# Patient Record
Sex: FEMALE | Race: WHITE | Employment: OTHER | URBAN - METROPOLITAN AREA
[De-identification: names, ages, dates, MRNs, and addresses within clinical notes are randomized per-mention and may not be internally consistent; named-entity substitution may affect disease eponyms.]

---

## 2017-04-05 PROCEDURE — 88305 TISSUE EXAM BY PATHOLOGIST: CPT | Performed by: INTERNAL MEDICINE

## 2017-04-11 PROBLEM — F32.A DEPRESSION, UNSPECIFIED DEPRESSION TYPE: Status: ACTIVE | Noted: 2017-04-11

## 2017-05-10 PROCEDURE — 36415 COLL VENOUS BLD VENIPUNCTURE: CPT | Performed by: PEDIATRICS

## 2017-05-10 PROCEDURE — 82784 ASSAY IGA/IGD/IGG/IGM EACH: CPT | Performed by: PEDIATRICS

## 2017-07-06 PROBLEM — K90.0 ACD (ADULT CELIAC DISEASE): Status: ACTIVE | Noted: 2017-07-06

## 2017-07-06 PROBLEM — K90.0: Status: ACTIVE | Noted: 2017-07-06

## 2017-09-06 PROCEDURE — 36415 COLL VENOUS BLD VENIPUNCTURE: CPT | Performed by: PEDIATRICS

## 2017-09-06 PROCEDURE — 82784 ASSAY IGA/IGD/IGG/IGM EACH: CPT | Performed by: PEDIATRICS

## 2017-09-19 PROCEDURE — 82607 VITAMIN B-12: CPT | Performed by: FAMILY MEDICINE

## 2017-09-19 PROCEDURE — 82746 ASSAY OF FOLIC ACID SERUM: CPT | Performed by: FAMILY MEDICINE

## 2017-11-06 PROCEDURE — 82784 ASSAY IGA/IGD/IGG/IGM EACH: CPT | Performed by: PEDIATRICS

## 2017-11-06 PROCEDURE — 36415 COLL VENOUS BLD VENIPUNCTURE: CPT | Performed by: PEDIATRICS

## 2017-12-06 PROCEDURE — 36415 COLL VENOUS BLD VENIPUNCTURE: CPT | Performed by: FAMILY MEDICINE

## 2017-12-06 PROCEDURE — 82746 ASSAY OF FOLIC ACID SERUM: CPT | Performed by: FAMILY MEDICINE

## 2017-12-06 PROCEDURE — 82607 VITAMIN B-12: CPT | Performed by: FAMILY MEDICINE

## 2018-03-28 PROCEDURE — 82784 ASSAY IGA/IGD/IGG/IGM EACH: CPT | Performed by: PEDIATRICS

## 2018-07-18 PROCEDURE — 82784 ASSAY IGA/IGD/IGG/IGM EACH: CPT | Performed by: PEDIATRICS

## 2018-07-18 PROCEDURE — 36415 COLL VENOUS BLD VENIPUNCTURE: CPT | Performed by: PEDIATRICS

## 2018-11-07 PROCEDURE — 36415 COLL VENOUS BLD VENIPUNCTURE: CPT | Performed by: PEDIATRICS

## 2018-11-07 PROCEDURE — 82784 ASSAY IGA/IGD/IGG/IGM EACH: CPT | Performed by: PEDIATRICS

## 2019-02-27 PROCEDURE — 82784 ASSAY IGA/IGD/IGG/IGM EACH: CPT | Performed by: PEDIATRICS

## 2019-06-24 PROCEDURE — 36415 COLL VENOUS BLD VENIPUNCTURE: CPT | Performed by: PEDIATRICS

## 2019-06-24 PROCEDURE — 82784 ASSAY IGA/IGD/IGG/IGM EACH: CPT | Performed by: PEDIATRICS

## 2019-10-02 PROCEDURE — 82784 ASSAY IGA/IGD/IGG/IGM EACH: CPT | Performed by: PEDIATRICS

## 2019-10-02 PROCEDURE — 36415 COLL VENOUS BLD VENIPUNCTURE: CPT | Performed by: PEDIATRICS

## 2019-12-16 PROBLEM — J44.9 CHRONIC OBSTRUCTIVE PULMONARY DISEASE, UNSPECIFIED COPD TYPE (HCC): Status: ACTIVE | Noted: 2019-12-16

## 2020-09-21 ENCOUNTER — APPOINTMENT (OUTPATIENT)
Dept: LAB | Facility: HOSPITAL | Age: 75
End: 2020-09-21
Attending: INTERNAL MEDICINE
Payer: MEDICARE

## 2020-09-21 DIAGNOSIS — Z12.11 COLON CANCER SCREENING: ICD-10-CM

## 2020-09-22 LAB — SARS-COV-2 RNA RESP QL NAA+PROBE: NOT DETECTED

## 2020-09-23 ENCOUNTER — ANESTHESIA EVENT (OUTPATIENT)
Dept: ENDOSCOPY | Facility: HOSPITAL | Age: 75
End: 2020-09-23
Payer: MEDICARE

## 2020-09-23 ENCOUNTER — HOSPITAL ENCOUNTER (OUTPATIENT)
Facility: HOSPITAL | Age: 75
Setting detail: HOSPITAL OUTPATIENT SURGERY
Discharge: HOME | End: 2020-09-23
Attending: INTERNAL MEDICINE | Admitting: INTERNAL MEDICINE
Payer: MEDICARE

## 2020-09-23 ENCOUNTER — ANESTHESIA (OUTPATIENT)
Dept: ENDOSCOPY | Facility: HOSPITAL | Age: 75
End: 2020-09-23
Payer: MEDICARE

## 2020-09-23 VITALS
TEMPERATURE: 99 F | RESPIRATION RATE: 20 BRPM | HEIGHT: 64 IN | OXYGEN SATURATION: 99 % | HEART RATE: 66 BPM | SYSTOLIC BLOOD PRESSURE: 120 MMHG | BODY MASS INDEX: 30.05 KG/M2 | DIASTOLIC BLOOD PRESSURE: 87 MMHG | WEIGHT: 176 LBS

## 2020-09-23 DIAGNOSIS — Z12.11 COLON CANCER SCREENING: Primary | ICD-10-CM

## 2020-09-23 DIAGNOSIS — K90.9 DIARRHEA DUE TO MALABSORPTION: ICD-10-CM

## 2020-09-23 DIAGNOSIS — R19.7 DIARRHEA DUE TO MALABSORPTION: ICD-10-CM

## 2020-09-23 DIAGNOSIS — Z86.010 PERSONAL HISTORY OF COLONIC POLYPS: ICD-10-CM

## 2020-09-23 PROCEDURE — 0DBB8ZX EXCISION OF ILEUM, VIA NATURAL OR ARTIFICIAL OPENING ENDOSCOPIC, DIAGNOSTIC: ICD-10-PCS | Performed by: INTERNAL MEDICINE

## 2020-09-23 PROCEDURE — 88305 TISSUE EXAM BY PATHOLOGIST: CPT | Performed by: INTERNAL MEDICINE

## 2020-09-23 PROCEDURE — 0DBE8ZX EXCISION OF LARGE INTESTINE, VIA NATURAL OR ARTIFICIAL OPENING ENDOSCOPIC, DIAGNOSTIC: ICD-10-PCS | Performed by: INTERNAL MEDICINE

## 2020-09-23 PROCEDURE — 0DB68ZX EXCISION OF STOMACH, VIA NATURAL OR ARTIFICIAL OPENING ENDOSCOPIC, DIAGNOSTIC: ICD-10-PCS | Performed by: INTERNAL MEDICINE

## 2020-09-23 RX ORDER — LIDOCAINE HYDROCHLORIDE 10 MG/ML
INJECTION, SOLUTION EPIDURAL; INFILTRATION; INTRACAUDAL; PERINEURAL AS NEEDED
Status: DISCONTINUED | OUTPATIENT
Start: 2020-09-23 | End: 2020-09-23 | Stop reason: SURG

## 2020-09-23 RX ORDER — HYDROCODONE BITARTRATE AND ACETAMINOPHEN 10; 325 MG/1; MG/1
1 TABLET ORAL AS NEEDED
Status: CANCELLED | OUTPATIENT
Start: 2020-09-23

## 2020-09-23 RX ORDER — SODIUM CHLORIDE, SODIUM LACTATE, POTASSIUM CHLORIDE, CALCIUM CHLORIDE 600; 310; 30; 20 MG/100ML; MG/100ML; MG/100ML; MG/100ML
INJECTION, SOLUTION INTRAVENOUS CONTINUOUS
Status: DISCONTINUED | OUTPATIENT
Start: 2020-09-23 | End: 2020-09-23

## 2020-09-23 RX ORDER — NALOXONE HYDROCHLORIDE 0.4 MG/ML
80 INJECTION, SOLUTION INTRAMUSCULAR; INTRAVENOUS; SUBCUTANEOUS AS NEEDED
Status: CANCELLED | OUTPATIENT
Start: 2020-09-23 | End: 2020-09-23

## 2020-09-23 RX ORDER — ONDANSETRON 2 MG/ML
4 INJECTION INTRAMUSCULAR; INTRAVENOUS AS NEEDED
Status: DISCONTINUED | OUTPATIENT
Start: 2020-09-23 | End: 2020-09-23

## 2020-09-23 RX ORDER — HYDROMORPHONE HYDROCHLORIDE 1 MG/ML
0.4 INJECTION, SOLUTION INTRAMUSCULAR; INTRAVENOUS; SUBCUTANEOUS EVERY 5 MIN PRN
Status: DISCONTINUED | OUTPATIENT
Start: 2020-09-23 | End: 2020-09-23

## 2020-09-23 RX ORDER — HYDROCODONE BITARTRATE AND ACETAMINOPHEN 10; 325 MG/1; MG/1
2 TABLET ORAL AS NEEDED
Status: CANCELLED | OUTPATIENT
Start: 2020-09-23

## 2020-09-23 RX ORDER — METOCLOPRAMIDE HYDROCHLORIDE 5 MG/ML
10 INJECTION INTRAMUSCULAR; INTRAVENOUS AS NEEDED
Status: DISCONTINUED | OUTPATIENT
Start: 2020-09-23 | End: 2020-09-23

## 2020-09-23 RX ADMIN — LIDOCAINE HYDROCHLORIDE 25 MG: 10 INJECTION, SOLUTION EPIDURAL; INFILTRATION; INTRACAUDAL; PERINEURAL at 11:03:00

## 2020-09-23 NOTE — ANESTHESIA POSTPROCEDURE EVALUATION
[facility] Patient Status:  Hospital Outpatient Surgery   Age/Gender 70year old female MRN [id number]   Location [facility]. Attending Meredith Martinez MD   Hosp Day # 0 PCP Magdalena Gilmore MD       Anesthesia Pos

## 2020-09-23 NOTE — H&P
[address] Patient Status:  Hospital Outpatient Surgery    1945 MRN [id number]   [facility] ENDOSCOPY Attending Jose Francisco Haskins MD   Hosp Day # 0 PCP Demetrio Anthony MD     Date:   4/17    celiac; duodenitis   • STRESS ECHO TEST, CARDIO (DMG)  8/12    neg   • THYROID ABLATION  1980   • UPPER GI ENDOSCOPY - REFERRAL  7/27/11    duodenitis, possible celiac disease     Family History   Problem Relation Age of Onset   • Psychiatric Fathe (100 mcg total) by mouth daily. •  Venlafaxine HCl ER 75 MG Oral Capsule SR 24 Hr, Take 3 capsules (225 mg total) by mouth daily. •  ALPRAZolam 0.5 MG Oral Tab, Take 1 tablet (0.5 mg total) by mouth 2 (two) times daily as needed.     •  alendronate 70 11/14/2019    ALKPHO 98 11/14/2019    BILT 0.35 11/14/2019    TP 7.7 11/14/2019    AST 26 12/16/2019    ALT 14 12/16/2019    PTT 26.4 09/07/2016    INR 0.94 09/07/2016    T4F 1.56 07/01/2020    TSH 0.192 (L) 07/01/2020    GGT 41 05/25/2011    DDIMER <0.27

## 2020-09-23 NOTE — OPERATIVE REPORT
EGD/Colonoscopy Operative Report    Rex Boyce Patient Status:  Hospital Outpatient Surgery    1945 MRN [id number]   [facility] ENDOSCOPY Attending Xena Parker MD   [facility] Day patient tolerated the procedure well with no immediate complications. The patient was then repositioned for colonoscopy.   After careful digital rectal examination, the Adult colonoscope was inserted into the rectum and advanced to the level of the cecum un

## 2020-09-23 NOTE — ANESTHESIA PREPROCEDURE EVALUATION
PRE-OP EVALUATION    Patient Name: Ron Mendoza    Pre-op Diagnosis: Diarrhea due to malabsorption [K90.9, R19.7]  Personal history of colonic polyps [Z86.010]    Procedure(s):  COLONOSCOPY, ESOPHAGOGASTRODUODENOSCOPY      Surgeon(s) and Role: Symbicort, Tylenol [Acetaminophen], and Zoloft      Anesthesia Evaluation    Patient summary reviewed.     Anesthetic Complications           GI/Hepatic/Renal                                 Cardiovascular                (+) obesity  (+) hypertension FB  TM distance: < 4 cm  Neck ROM: limited Cardiovascular    Cardiovascular exam normal.  Rhythm: regular  Rate: normal     Dental             Pulmonary    Pulmonary exam normal.  Breath sounds clear to auscultation bilaterally.                Other finding

## 2020-11-17 PROBLEM — F32.A DEPRESSION, UNSPECIFIED DEPRESSION TYPE: Status: RESOLVED | Noted: 2017-04-11 | Resolved: 2020-11-17

## 2020-11-17 PROBLEM — F32.0 CURRENT MILD EPISODE OF MAJOR DEPRESSIVE DISORDER WITHOUT PRIOR EPISODE (HCC): Status: ACTIVE | Noted: 2020-11-17

## 2020-11-17 PROBLEM — F32.0 CURRENT MILD EPISODE OF MAJOR DEPRESSIVE DISORDER WITHOUT PRIOR EPISODE: Status: ACTIVE | Noted: 2020-11-17

## 2020-11-17 PROBLEM — K52.832 LYMPHOCYTIC COLITIS: Status: ACTIVE | Noted: 2020-11-17

## 2021-06-30 PROBLEM — D69.6 PLATELETS DECREASED (HCC): Status: ACTIVE | Noted: 2021-06-30

## 2021-06-30 PROBLEM — D69.6 PLATELETS DECREASED: Status: ACTIVE | Noted: 2021-06-30

## 2021-08-26 PROBLEM — R25.1 TREMOR OF LEFT HAND: Status: ACTIVE | Noted: 2021-08-26

## 2021-08-26 PROBLEM — R41.9 COGNITIVE COMPLAINTS: Status: ACTIVE | Noted: 2021-08-26

## 2021-08-26 PROBLEM — G44.059 SUNCT (SHORT UNILATERAL NEURALGIFORM HEADACHE, CONJUNCTIVAL INJ/TEAR): Status: ACTIVE | Noted: 2021-08-26

## 2021-12-20 PROBLEM — I67.1 BRAIN ANEURYSM: Status: ACTIVE | Noted: 2021-01-01

## 2021-12-20 PROBLEM — I67.1 BRAIN ANEURYSM (HCC): Status: ACTIVE | Noted: 2021-01-01

## 2023-08-15 ENCOUNTER — TELEPHONE (OUTPATIENT)
Dept: SURGERY | Facility: CLINIC | Age: 78
End: 2023-08-15

## 2023-08-15 NOTE — TELEPHONE ENCOUNTER
Chart Scrub Encounter:    Next Diabetic Visit Scheduled:  Yes      Labs:  PSR will call patient for lab appointment.Patient is due for  Hemoglobin A1C.  Order is entered.      Diabetic Education:  Patient has no record of attending Diabetic Education.    Statin Medication:  Patient is taking Simvastatin.     Eye Exam:  Date of last exam 6/8/20.   Patient is due.    Foot Exam:  Date of last exam 5/18/21.   Patient is not due.    PSR please call patient and schedule fasting lab appointment.          Pt looking for 2nd opinion for aneurysm, pt will c/b to schedule with Dr Kim Kaba once knows when can obtain disks of recent imaging,

## 2023-09-18 ENCOUNTER — OFFICE VISIT (OUTPATIENT)
Dept: SURGERY | Facility: CLINIC | Age: 78
End: 2023-09-18
Payer: MEDICARE

## 2023-09-18 VITALS
HEIGHT: 63.5 IN | HEART RATE: 80 BPM | DIASTOLIC BLOOD PRESSURE: 82 MMHG | SYSTOLIC BLOOD PRESSURE: 140 MMHG | BODY MASS INDEX: 32.2 KG/M2 | WEIGHT: 184 LBS

## 2023-09-18 DIAGNOSIS — I67.1 BRAIN ANEURYSM: Primary | ICD-10-CM

## 2023-09-18 PROBLEM — R93.1 AGATSTON CAC SCORE, >400: Status: ACTIVE | Noted: 2023-01-23

## 2023-09-18 PROBLEM — R55 NEAR SYNCOPE: Status: ACTIVE | Noted: 2023-08-09

## 2023-09-18 RX ORDER — ATORVASTATIN CALCIUM 40 MG/1
TABLET, FILM COATED ORAL
COMMUNITY
Start: 2023-09-13

## 2023-09-18 RX ORDER — ATORVASTATIN CALCIUM 20 MG/1
TABLET, FILM COATED ORAL
COMMUNITY
Start: 2023-06-03 | End: 2023-09-18

## 2023-09-18 RX ORDER — DIPHENOXYLATE HYDROCHLORIDE AND ATROPINE SULFATE 2.5; .025 MG/1; MG/1
1 TABLET ORAL 2 TIMES DAILY PRN
COMMUNITY
Start: 2023-08-25

## 2023-09-20 ENCOUNTER — OFFICE VISIT (OUTPATIENT)
Dept: NEUROLOGY | Facility: CLINIC | Age: 78
End: 2023-09-20
Payer: MEDICARE

## 2023-09-20 VITALS
WEIGHT: 184 LBS | BODY MASS INDEX: 32 KG/M2 | DIASTOLIC BLOOD PRESSURE: 78 MMHG | OXYGEN SATURATION: 95 % | SYSTOLIC BLOOD PRESSURE: 126 MMHG | RESPIRATION RATE: 16 BRPM | HEART RATE: 74 BPM

## 2023-09-20 DIAGNOSIS — R55 NEAR SYNCOPE: ICD-10-CM

## 2023-09-20 DIAGNOSIS — R51.9 LEFT TEMPORAL HEADACHE: Primary | ICD-10-CM

## 2023-09-20 PROCEDURE — 99204 OFFICE O/P NEW MOD 45 MIN: CPT | Performed by: OTHER

## 2023-09-20 NOTE — PROGRESS NOTES
Patient states a shooting pain on the left temporal region. Patient states this started about 2 years ago. Patient denies HA or migraines. Patient states 2 episodes of changes in vision and/or loss of vision. Patient states each episode did not last long. Patient states a history of balance issues. Patient denies changes in memory. Patient states a slight dizziness sensation. Patient has left hand tremors.

## 2023-10-03 ENCOUNTER — NURSE ONLY (OUTPATIENT)
Dept: ELECTROPHYSIOLOGY | Facility: HOSPITAL | Age: 78
End: 2023-10-03
Attending: Other
Payer: MEDICARE

## 2023-10-03 DIAGNOSIS — R51.9 LEFT TEMPORAL HEADACHE: ICD-10-CM

## 2023-10-03 DIAGNOSIS — R55 NEAR SYNCOPE: ICD-10-CM

## 2023-10-03 PROCEDURE — 95816 EEG AWAKE AND DROWSY: CPT | Performed by: OTHER

## 2023-10-03 NOTE — PROCEDURES
Date of Procedure: 10/3/2023    Procedure: EEG (ELECTROENCEPHALOGRAM)     LEFT TEMPORAL HEADACHES, NEAR SYNCOPE, TREMOR  HISTORY: 70YEARS OLD FEMALE FOR THE LAST 2 YEARS REPORTS SHOOTING PAIN ON LEFT TEMPORAL REGION; EPISODES OF CHANGE IN VISION, LEFT HAND TREMORS WITH EPISODES OF NEAR SYNCOPE. 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 Noted also are generalized intermittent slowing. Drowsiness is characterized by intermittent theta waves bitemporally. Occasional sharp transients noted in posterior region. EPILEPTIFORM DISCHARGES: There were no epileptiform discharges or periodic lateralized epileptiform discharges (PLEDs) recorded throughout the recording. HYPERVENTILATION: Hyperventilation was not performed. PHOTIC STIMULATION: Photic stimulation was performed with no change. Stage II sleep was not reached. IMPRESSION: Unremarkable EEG without any interictal discharges, electrographic seizure activity or epileptiform activity. However, this does not rule out seizure disorder. Clinical correlation is advised.     Lucy Hamlin MD   Neurology  [facility]  10/3/2023, 1:56 PM  CC: Steve Huber MD

## 2023-10-16 ENCOUNTER — TELEPHONE (OUTPATIENT)
Dept: NEUROLOGY | Facility: CLINIC | Age: 78
End: 2023-10-16

## 2023-10-16 NOTE — TELEPHONE ENCOUNTER
Pt called to obtain her EEG results from early this month. She states that she hasn't received anything through her [facility].  Please advise

## 2025-02-05 NOTE — H&P
[facility]  Division of Cardiology  Pre-procedure  Updated H&P       Patient Name: Diana Swanson  MRN: [id number]  [facility]: [id number]  YOB: 1945    Diagnosis: Abnormal FFR, CTA, symptomatic CAD    Present Illness: CREWS and dizziness and CTA with obstructive CAD by FFR.    Interval change: None.    Medications Prior to Admission   Medication Sig    metoprolol succinate ER 25 MG Oral Tablet 24 Hr Take 1 tablet (25 mg total) by mouth daily.    Cholecalciferol (VITAMIN D3) 1.25 MG (81467 UT) Oral Cap Take 1 capsule by mouth once a week. Tuesday    atorvastatin 40 MG Oral Tab Take 1 tablet (40 mg total) by mouth daily.    diphenoxylate-atropine 2.5-0.025 MG Oral Tab Take 1 tablet by mouth 2 (two) times daily as needed for Diarrhea.    ALPRAZolam 0.5 MG Oral Tab Take 1 tablet (0.5 mg total) by mouth 2 (two) times daily as needed.    dicyclomine 10 MG Oral Cap Take 1 capsule (10 mg total) by mouth 4 (four) times daily as needed.    levothyroxine 112 MCG Oral Tab Take 1 tablet (112 mcg total) by mouth before breakfast.    venlafaxine 75 MG Oral Capsule SR 24 Hr Take 3 capsules (225 mg total) by mouth daily.    lisinopril 10 MG Oral Tab Take 1 tablet (10 mg total) by mouth daily.    Immune Globulin, Human, 10 GM/100ML Injection Solution Inject 40 g into the vein every 28 days. Gammagard    Albuterol Sulfate HFA (PROAIR HFA) 108 (90 Base) MCG/ACT Inhalation Aero Soln Inhale 2 puffs into the lungs every 4 (four) hours as needed for Wheezing or Shortness of Breath.    Acidophilus/Pectin Oral Cap Take 1 capsule by mouth nightly.    CYANOCOBALAMIN IJ Inject 1,000 mcg as directed. Every 3 months       Allergies: Allergies[1]    History:  Past Medical History:    Adenomatous colon polyp    ANXIETY    Brain aneurysm (HCC)    1.5 mm right cavernous ICA    Celiac disease (HCC)    DEPRESSION    Elevated liver enzymes    possible CBD stone that passed    Folate deficiency     HEADACHES    Hearing impairment    bilat HA    High blood pressure    Hyperlipidemia    HYPERTHYROIDISM    s/p BRENNAN    Hypogammaglobulinemia (HCC)    Left carotid artery stenosis    Lymphocytic colitis    Migraines    barometric presssure    Osteopenia    fem neck -1.8, hip -1.1, lumbar -1.0    SEASONAL ALLERGIES    Visual impairment    glasses    Vitamin B12 deficiency     Past Surgical History:   Procedure Laterality Date    Cholecystectomy  1997    Colonoscopy  2000    polyp    Colonoscopy  7/27/11    hemorrhoids, repeat 2016 w/MAC    Colonoscopy N/A 4/5/2017    Procedure: COLONOSCOPY, POSSIBLE BIOPSY, POSSIBLE POLYPECTOMY 56968;  Surgeon: Escobar Kelsey MD;  Location: [facility]    Colonoscopy N/A 9/23/2020    Procedure: COLONOSCOPY;  Surgeon: Tushar Kuhn MD;  Location:  ENDOSCOPY    Colonoscopy & polypectomy  4/17- repeat 2020    polyps    Colonoscopy,biopsy  7/27/2011    Performed by NORBERTO PRAKASH at [facility], [facility]    Colonoscopy,remv lesn,snare  7/27/2011    Performed by NORBERTO PRAKASH at [facility], [facility]    Ct heart w/ calcium scoring  2011    126    D & c  1991    Gastro - [facility]  4/17    celiac; duodenitis    Stress echo test, cardio ([facility])  8/12    neg    Thyroid ablation  1980    Upper gi endoscopy - referral  7/27/11    duodenitis, possible celiac disease    Upper gi endoscopy,biopsy  7/27/2011    Performed by NORBERTO PRAKASH at [facility], [facility]     Social History     Tobacco Use    Smoking status: Every Day     Current packs/day: 0.50     Average packs/day: 0.5 packs/day for 45.0 years (22.5 ttl pk-yrs)     Types: Cigarettes    Smokeless tobacco: Never   Substance Use Topics    Alcohol use: Not Currently     Comment: rarely     Family History   Problem Relation Age of Onset    Psychiatric Father         Alcoholic    Other (Other) Mother         thyroid and osteoporosis    Cancer Brother         brain    Other (liver cirrhosis) Brother     Breast Cancer Maternal Aunt 60         age at dx 60    Cancer Maternal Grandfather         Colon    Cancer Other         Maternal Aunts with Breast Cancer    Heart Disorder Other         Maternal Aunts    Lipids Other     Diabetes Other         Uncle and Aunts    Breast Cancer Maternal Aunt 60        age at dx 60       Objective:  Vitals:    02/10/25 1228   BP: (!) 119/108   Pulse: 69   Resp: 16   Temp: 97.8 °F (36.6 °C)       Exam:  General: NAD  Neck: No JVD  Lungs: CTA bilat  Heart: RRR, S1, S2  Abdomen: Soft, NT/ND, BS+x4  Extremities: Warm, dry, no LE edema bilat  Pulses: 2+ bilat DP  Skin: no rashes or legions noted  Neurological:  AAOx3, MAEW    Labs:             Plan:  Cardiac catheterization, coronary angio, +/- PCI.  Further recommendations after above.    Informed Consent:  I've discussed the procedure at length with the patient including the risks, benefits, and alternatives.  They wish to proceed.  I've reviewed the H&P and any changes within the last 30 days are noted above.    Thank you for allowing our group to care for your patient. Please contact me with any questions!     Ángel Miller MD  General, Interventional  Structural & Endovascular  Cardiology                  [1]   Allergies  Allergen Reactions    Aspirin RASH    Codeine NAUSEA AND VOMITING    Erythromycin ANAPHYLAXIS and SWELLING    Lexapro     Omnicef     Paxil Cr [Fd&C Blue #2 Al Maguire-Paroxetine] DIARRHEA    Streptomycin     Sulfa Antibiotics     Symbicort     Tylenol [Acetaminophen] DIARRHEA    Zoloft DIARRHEA    Paroxetine Hcl DIARRHEA

## 2025-02-06 RX ORDER — FOLIC ACID 1 MG/1
1 TABLET ORAL WEEKLY
COMMUNITY

## 2025-02-06 RX ORDER — METOPROLOL SUCCINATE 25 MG/1
25 TABLET, EXTENDED RELEASE ORAL DAILY
COMMUNITY

## 2025-02-06 NOTE — PAT NURSING NOTE
Per PAT encounter/MyChart message sent to pt/took notes:    PreOp Instructions     You are scheduled for: a Cardiac Procedure     Date of Procedure: 02/10/25 Monday     Diet Instructions: Do not eat or drink anything after midnight including gum, mints, candy, etc.     Medications: Medications you are allowed to take can be taken with a sip of water the morning of your procedure     Medications to Stop: Hold herbal supplements and vitamins     Skin Prep : Shower with antibacterial soap using a clean washcloth, prior to procedure. Once dried off, no lotions/powders/creams/ointments, etc., Do not shave the procedure area, this will be completed at the hospital during the preparation phase.     Arrival Time: The Friday prior to your procedure you will receive a phone call before 6:00 pm with your arrival time. If you haven't received a phone call, please check your voicemail messages., If you did not receive a voice mail and it is after 6:00 pm, please call the nursing supervisor at [phone].    Driving After Procedure: Sedation will be given so you WILL NOT be able to drive home. You will need a responsible adult  to drive you home. You can NOT take uber or taxi unless approved by your physician in advance.     Discharge Teaching: Your nurse will give you specific instructions before discharge, Most people can resume normal activities in 2-3 days, Any questions, please call the physician's office      parking is available starting at 6 am or park in the [place] parking garage at [facility]. Check in at the [facility] reception desk. Our  will be there to check you in for your procedure. Please bring your insurance cards and ID with you.                                                                                                                                      Please DO NOT respond to this message, the inbasket is not monitored for messages. For any questions,  please call the physician's office.

## 2025-02-10 ENCOUNTER — HOSPITAL ENCOUNTER (OUTPATIENT)
Dept: INTERVENTIONAL RADIOLOGY/VASCULAR | Facility: HOSPITAL | Age: 80
Discharge: HOME | End: 2025-02-10
Attending: INTERNAL MEDICINE | Admitting: INTERNAL MEDICINE
Payer: MEDICARE

## 2025-02-10 VITALS
HEIGHT: 64 IN | SYSTOLIC BLOOD PRESSURE: 103 MMHG | TEMPERATURE: 98 F | WEIGHT: 184 LBS | BODY MASS INDEX: 31.41 KG/M2 | RESPIRATION RATE: 21 BRPM | HEART RATE: 61 BPM | DIASTOLIC BLOOD PRESSURE: 48 MMHG | OXYGEN SATURATION: 98 %

## 2025-02-10 DIAGNOSIS — R93.1 ABNORMAL FINDINGS DIAGNOSTIC IMAGING OF HEART AND CORONARY CIRCULATION: ICD-10-CM

## 2025-02-10 PROCEDURE — 93454 CORONARY ARTERY ANGIO S&I: CPT | Performed by: INTERNAL MEDICINE

## 2025-02-10 PROCEDURE — 93005 ELECTROCARDIOGRAM TRACING: CPT

## 2025-02-10 PROCEDURE — 93799 UNLISTED CV SVC/PROCEDURE: CPT | Performed by: INTERNAL MEDICINE

## 2025-02-10 PROCEDURE — 4A033BC MEASUREMENT OF ARTERIAL PRESSURE, CORONARY, PERCUTANEOUS APPROACH: ICD-10-PCS | Performed by: INTERNAL MEDICINE

## 2025-02-10 PROCEDURE — 99211 OFF/OP EST MAY X REQ PHY/QHP: CPT

## 2025-02-10 PROCEDURE — 93010 ELECTROCARDIOGRAM REPORT: CPT | Performed by: INTERNAL MEDICINE

## 2025-02-10 PROCEDURE — B2111ZZ FLUOROSCOPY OF MULTIPLE CORONARY ARTERIES USING LOW OSMOLAR CONTRAST: ICD-10-PCS | Performed by: INTERNAL MEDICINE

## 2025-02-10 PROCEDURE — 99152 MOD SED SAME PHYS/QHP 5/>YRS: CPT | Performed by: INTERNAL MEDICINE

## 2025-02-10 PROCEDURE — 99153 MOD SED SAME PHYS/QHP EA: CPT | Performed by: INTERNAL MEDICINE

## 2025-02-10 PROCEDURE — 02703ZZ DILATION OF CORONARY ARTERY, ONE ARTERY, PERCUTANEOUS APPROACH: ICD-10-PCS | Performed by: INTERNAL MEDICINE

## 2025-02-10 RX ORDER — LIDOCAINE HYDROCHLORIDE 10 MG/ML
INJECTION, SOLUTION EPIDURAL; INFILTRATION; INTRACAUDAL; PERINEURAL
Status: COMPLETED
Start: 2025-02-10 | End: 2025-02-10

## 2025-02-10 RX ORDER — NITROGLYCERIN 20 MG/100ML
INJECTION INTRAVENOUS
Status: COMPLETED
Start: 2025-02-10 | End: 2025-02-10

## 2025-02-10 RX ORDER — IOPAMIDOL 755 MG/ML
200 INJECTION, SOLUTION INTRAVASCULAR
Status: COMPLETED | OUTPATIENT
Start: 2025-02-10 | End: 2025-02-10

## 2025-02-10 RX ORDER — ASPIRIN 81 MG/1
81 TABLET ORAL DAILY
Status: DISCONTINUED | OUTPATIENT
Start: 2025-02-11 | End: 2025-02-10

## 2025-02-10 RX ORDER — VERAPAMIL HYDROCHLORIDE 2.5 MG/ML
INJECTION, SOLUTION INTRAVENOUS
Status: COMPLETED
Start: 2025-02-10 | End: 2025-02-10

## 2025-02-10 RX ORDER — MIDAZOLAM HYDROCHLORIDE 1 MG/ML
INJECTION INTRAMUSCULAR; INTRAVENOUS
Status: COMPLETED
Start: 2025-02-10 | End: 2025-02-10

## 2025-02-10 RX ORDER — HEPARIN SODIUM 5000 [USP'U]/ML
INJECTION, SOLUTION INTRAVENOUS; SUBCUTANEOUS
Status: COMPLETED
Start: 2025-02-10 | End: 2025-02-10

## 2025-02-10 RX ORDER — SODIUM CHLORIDE 9 MG/ML
INJECTION, SOLUTION INTRAVENOUS CONTINUOUS
Status: DISCONTINUED | OUTPATIENT
Start: 2025-02-10 | End: 2025-02-10

## 2025-02-10 RX ORDER — SODIUM CHLORIDE 9 MG/ML
INJECTION, SOLUTION INTRAVENOUS
Status: DISCONTINUED | OUTPATIENT
Start: 2025-02-11 | End: 2025-02-10 | Stop reason: HOSPADM

## 2025-02-10 RX ADMIN — IOPAMIDOL 230 ML: 755 INJECTION, SOLUTION INTRAVASCULAR at 14:56:00

## 2025-02-10 NOTE — CARDIAC REHAB
Cardiac rehab education completed with patient and son  Stent card given  Patient referred to cardiac rehab  Patient to call for appt

## 2025-02-10 NOTE — PROCEDURES
[facility] Division of Cardiology   Cardiac Catheterization & Percutaneous Coronary Intervention     Diana Swanson Location: [facility]    CSN [id number] MRN [id number]   Admission Date 2/10/2025 Procedure Date 2/10/2025   Attending Physician Ángel Miller MD Procedure Physician Ángel Miller MD     PREOPERATIVE DIAGNOSIS:  CAD, abnormal stress  POSTOPERATIVE DIAGNOSIS:  CAD  PROCEDURE PERFORMED:  Coronary angiogram, PCI to LAD with SANDRA, iFR LAD and DX      PROCEDURE:    Moderate sedation: The patient was brought to the cardiac catheterization lab in the fasting state.  Informed consent was previously obtained.  Moderate sedation was employed using 6mg IV Versed and 125mcg IV Fentanyl.  I directly observed the patient from 310 to 205, watching the heart rate, blood pressure, oximetry, and rhythm.  An independent, trained observer was present throughout the procedure and assisted in the monitoring of the patient's level of consciousness and physiologic states.  Please see the [facility] Catheterization Report (MCRTM) in [facility] for further, technical details.  Vascular access and catheter placement:  A 6F right radial, slender sheath was utilized after micropuncture access gained.  A Christine catheter was used for LM and for RCA engagement and subsequent angiography.  Standard over the wire technique was utilized.  GLAA and GONZALEZ angiographic views with caudal and cranial angulation were used for cineangiography.  Hemodynamics were measured in the standard fashion using fluid filled, pressure manometry via the ASSISTTM device and analyzed with the Nulu catheterization software.  At the end of the case, a TRTM band was used for arterial hemostasis and our standard protocol was followed.  Coronary or endovascular intervention: EBU 3.5 guide.  Heparin.  BMW wire to \"anchor\" guide and Volcano Pressure WireTM to perform iFR.  We ran it down 3 spots in LAD and into the diagonal.  iFR  was clearly positive in distal LAD (0.84) and borderline in mid LAD (0.9) and negative in proximal LAD (after the proximal lesion -- 0.93).  In the DX the iFR was 0.9.  Taken together this implied the mid to distal LAD (after lesion 3 in LAD) was ischemic.  We ballooned with a 2.0mm balloon, stented with [id number] Reynold SANDRA at 12ATM and post dilated with 2.5mm NC to 16ATM.  Final pics after NTG given IC for spasm.  Excellent result.  Case ended.       DIAGNOSTIC FINDINGS:    Coronary angiography:    LMCA: The left main artery is normal.  LAD:  The left anterior descending artery is large.  Proximally there is a 40% stenosis.  After the second diagonal (significant sized branch) there is a 70% stenosis (iFR borderline).  The diagonal has an iFR also of 0.9.  Then there  is a \"hazy\" 70% stenosis in the mid LAD (and just distal to this the iFR was 0.84 (significant).  LCX: The left circumflex artery is moderate in caliber with mild disease.   RCA:  The right coronary artery is dominant, large with a large PDA and large KAYLAH. Moderate disease.    MEDICATIONS:  See nursing records, MCR, and EMR.     COMPLICATIONS:  None.     IMPRESSION:    CAD as above.  CT FFR mid to distal LAD and iFR positive (significantly) in the same territory.  S/P PCI to mid to distal LAD with SANDRA.    RECOMMENDATIONS:                                                                     Brilinta 90mg BID x 1 month then Plavix 75mg daily afterwards.  May try ASA again given it only caused a rash.    Medically manage CAD.  Follow up in clinic with myself or Sara Humes in 2-3 weeks.  Home later today baring any issues.    Ángel Miller MD  General, Interventional  Structural & Endovascular  Cardiology

## 2025-02-10 NOTE — DIETARY NOTE
Clinical Nutrition    Dietitian consult received per cardiac rehab standing order. Pt to be educated by cardiac rehab staff and encouraged to attend outpatient classes taught by RD. RD available PRN.    Lizbeth Pierce MS, RD, LDN  Clinical Dietitian  Ext: [phone]

## 2025-02-11 LAB
ATRIAL RATE: 66 BPM
P AXIS: 44 DEGREES
P-R INTERVAL: 224 MS
Q-T INTERVAL: 456 MS
QRS DURATION: 76 MS
QTC CALCULATION (BEZET): 478 MS
R AXIS: -20 DEGREES
T AXIS: 49 DEGREES
VENTRICULAR RATE: 66 BPM

## 2025-02-11 NOTE — PLAN OF CARE
Patient had PCI today with Dr. Miller. Right wrist access site with TR band in place. Site is CDI. Patient denies any numbness or tingling to right hand/fingers. Patient has good O2 pleth on right hand. VSS. Patient denies any pain. 12 lead EKG completed. Patient's son @ bedside. Patient tolerating po intake. Cardiac rehab @ bedside to see patient. Dr. Miller @ bedside post procedure. Air intermittently released from TR band until all air removed. TR band removed. Site is soft, CDI, +2 radial pulse. Occlusive dressing applied. Patient completed recovery time. Discharge instructions reviewed. IV D/C'd. Patient discharged to Health system by wheelchair with belongings. Son is .

## 2025-02-12 ENCOUNTER — PATIENT OUTREACH (OUTPATIENT)
Dept: CASE MANAGEMENT | Age: 80
End: 2025-02-12

## 2025-02-12 NOTE — PROGRESS NOTES
Called pt to schedule a hospital follow-up appt :    (Discharged 2/10 [facility])    [facility] Request :    Dr. Ángel Miller  [address] [place] [id number]  [phone]  Monday 3/3 @10am w/ Yamile ODEN        Appt scheduled & confirmed w/ [facility].  No further assistance needed        Closing encounter

## 2025-04-09 ENCOUNTER — ORDER TRANSCRIPTION (OUTPATIENT)
Dept: CARDIAC REHAB | Facility: HOSPITAL | Age: 80
End: 2025-04-09

## 2025-04-09 DIAGNOSIS — Z95.5 STENTED CORONARY ARTERY: Primary | ICD-10-CM

## 2025-04-15 ENCOUNTER — CARDPULM VISIT (OUTPATIENT)
Age: 80
End: 2025-04-15
Attending: INTERNAL MEDICINE
Payer: MEDICARE

## 2025-04-17 ENCOUNTER — APPOINTMENT (OUTPATIENT)
Age: 80
End: 2025-04-17
Attending: INTERNAL MEDICINE
Payer: MEDICARE

## 2025-04-22 ENCOUNTER — CARDPULM VISIT (OUTPATIENT)
Age: 80
End: 2025-04-22
Attending: INTERNAL MEDICINE
Payer: MEDICARE

## 2025-04-22 PROCEDURE — 93798 PHYS/QHP OP CAR RHAB W/ECG: CPT

## 2025-04-24 ENCOUNTER — CARDPULM VISIT (OUTPATIENT)
Age: 80
End: 2025-04-24
Attending: INTERNAL MEDICINE
Payer: MEDICARE

## 2025-04-24 PROCEDURE — 93798 PHYS/QHP OP CAR RHAB W/ECG: CPT

## 2025-04-29 ENCOUNTER — CARDPULM VISIT (OUTPATIENT)
Age: 80
End: 2025-04-29
Attending: INTERNAL MEDICINE
Payer: MEDICARE

## 2025-04-29 PROCEDURE — 93798 PHYS/QHP OP CAR RHAB W/ECG: CPT

## 2025-05-01 ENCOUNTER — APPOINTMENT (OUTPATIENT)
Age: 80
End: 2025-05-01
Attending: INTERNAL MEDICINE
Payer: MEDICARE

## 2025-05-02 ENCOUNTER — CARDPULM VISIT (OUTPATIENT)
Age: 80
End: 2025-05-02
Attending: INTERNAL MEDICINE
Payer: MEDICARE

## 2025-05-02 PROCEDURE — 93798 PHYS/QHP OP CAR RHAB W/ECG: CPT

## 2025-05-05 ENCOUNTER — CARDPULM VISIT (OUTPATIENT)
Age: 80
End: 2025-05-05
Attending: INTERNAL MEDICINE
Payer: MEDICARE

## 2025-05-05 PROCEDURE — 93798 PHYS/QHP OP CAR RHAB W/ECG: CPT

## 2025-05-06 ENCOUNTER — APPOINTMENT (OUTPATIENT)
Age: 80
End: 2025-05-06
Attending: INTERNAL MEDICINE
Payer: MEDICARE

## 2025-05-07 ENCOUNTER — CARDPULM VISIT (OUTPATIENT)
Age: 80
End: 2025-05-07
Attending: INTERNAL MEDICINE
Payer: MEDICARE

## 2025-05-07 PROCEDURE — 93798 PHYS/QHP OP CAR RHAB W/ECG: CPT

## 2025-05-08 ENCOUNTER — APPOINTMENT (OUTPATIENT)
Age: 80
End: 2025-05-08
Attending: INTERNAL MEDICINE
Payer: MEDICARE

## 2025-05-09 ENCOUNTER — CARDPULM VISIT (OUTPATIENT)
Age: 80
End: 2025-05-09
Attending: INTERNAL MEDICINE
Payer: MEDICARE

## 2025-05-09 PROCEDURE — 93798 PHYS/QHP OP CAR RHAB W/ECG: CPT

## 2025-05-12 ENCOUNTER — CARDPULM VISIT (OUTPATIENT)
Age: 80
End: 2025-05-12
Attending: INTERNAL MEDICINE
Payer: MEDICARE

## 2025-05-13 ENCOUNTER — APPOINTMENT (OUTPATIENT)
Age: 80
End: 2025-05-13
Attending: INTERNAL MEDICINE
Payer: MEDICARE

## 2025-05-14 ENCOUNTER — CARDPULM VISIT (OUTPATIENT)
Age: 80
End: 2025-05-14
Attending: INTERNAL MEDICINE
Payer: MEDICARE

## 2025-05-14 PROCEDURE — 93798 PHYS/QHP OP CAR RHAB W/ECG: CPT

## 2025-05-15 ENCOUNTER — APPOINTMENT (OUTPATIENT)
Age: 80
End: 2025-05-15
Attending: INTERNAL MEDICINE
Payer: MEDICARE

## 2025-05-16 ENCOUNTER — CARDPULM VISIT (OUTPATIENT)
Age: 80
End: 2025-05-16
Attending: INTERNAL MEDICINE
Payer: MEDICARE

## 2025-05-16 PROCEDURE — 93798 PHYS/QHP OP CAR RHAB W/ECG: CPT

## 2025-05-19 ENCOUNTER — CARDPULM VISIT (OUTPATIENT)
Age: 80
End: 2025-05-19
Attending: INTERNAL MEDICINE
Payer: MEDICARE

## 2025-05-19 PROCEDURE — 93798 PHYS/QHP OP CAR RHAB W/ECG: CPT

## 2025-05-20 ENCOUNTER — APPOINTMENT (OUTPATIENT)
Age: 80
End: 2025-05-20
Attending: INTERNAL MEDICINE
Payer: MEDICARE

## 2025-05-21 ENCOUNTER — CARDPULM VISIT (OUTPATIENT)
Age: 80
End: 2025-05-21
Attending: INTERNAL MEDICINE
Payer: MEDICARE

## 2025-05-21 PROCEDURE — 93798 PHYS/QHP OP CAR RHAB W/ECG: CPT

## 2025-05-22 ENCOUNTER — APPOINTMENT (OUTPATIENT)
Age: 80
End: 2025-05-22
Attending: INTERNAL MEDICINE
Payer: MEDICARE

## 2025-05-23 ENCOUNTER — CARDPULM VISIT (OUTPATIENT)
Age: 80
End: 2025-05-23
Attending: INTERNAL MEDICINE
Payer: MEDICARE

## 2025-05-23 PROCEDURE — 93798 PHYS/QHP OP CAR RHAB W/ECG: CPT

## 2025-05-26 ENCOUNTER — APPOINTMENT (OUTPATIENT)
Age: 80
End: 2025-05-26
Attending: INTERNAL MEDICINE
Payer: MEDICARE

## 2025-05-27 ENCOUNTER — APPOINTMENT (OUTPATIENT)
Age: 80
End: 2025-05-27
Attending: INTERNAL MEDICINE
Payer: MEDICARE

## 2025-05-28 ENCOUNTER — CARDPULM VISIT (OUTPATIENT)
Age: 80
End: 2025-05-28
Attending: INTERNAL MEDICINE
Payer: MEDICARE

## 2025-05-28 PROCEDURE — 93798 PHYS/QHP OP CAR RHAB W/ECG: CPT

## 2025-05-29 ENCOUNTER — APPOINTMENT (OUTPATIENT)
Age: 80
End: 2025-05-29
Attending: INTERNAL MEDICINE
Payer: MEDICARE

## 2025-05-30 ENCOUNTER — CARDPULM VISIT (OUTPATIENT)
Age: 80
End: 2025-05-30
Attending: INTERNAL MEDICINE
Payer: MEDICARE

## 2025-05-30 PROCEDURE — 93798 PHYS/QHP OP CAR RHAB W/ECG: CPT

## 2025-06-02 ENCOUNTER — CARDPULM VISIT (OUTPATIENT)
Age: 80
End: 2025-06-02
Attending: INTERNAL MEDICINE
Payer: MEDICARE

## 2025-06-02 PROCEDURE — 93798 PHYS/QHP OP CAR RHAB W/ECG: CPT

## 2025-06-03 ENCOUNTER — APPOINTMENT (OUTPATIENT)
Age: 80
End: 2025-06-03
Attending: INTERNAL MEDICINE
Payer: MEDICARE

## 2025-06-04 ENCOUNTER — CARDPULM VISIT (OUTPATIENT)
Age: 80
End: 2025-06-04
Attending: INTERNAL MEDICINE
Payer: MEDICARE

## 2025-06-04 PROCEDURE — 93798 PHYS/QHP OP CAR RHAB W/ECG: CPT

## 2025-06-05 ENCOUNTER — APPOINTMENT (OUTPATIENT)
Age: 80
End: 2025-06-05
Attending: INTERNAL MEDICINE
Payer: MEDICARE

## 2025-06-06 ENCOUNTER — CARDPULM VISIT (OUTPATIENT)
Age: 80
End: 2025-06-06
Attending: INTERNAL MEDICINE
Payer: MEDICARE

## 2025-06-06 PROCEDURE — 93798 PHYS/QHP OP CAR RHAB W/ECG: CPT

## 2025-06-09 ENCOUNTER — CARDPULM VISIT (OUTPATIENT)
Age: 80
End: 2025-06-09
Attending: INTERNAL MEDICINE
Payer: MEDICARE

## 2025-06-09 PROCEDURE — 93798 PHYS/QHP OP CAR RHAB W/ECG: CPT

## 2025-06-10 ENCOUNTER — APPOINTMENT (OUTPATIENT)
Age: 80
End: 2025-06-10
Attending: INTERNAL MEDICINE
Payer: MEDICARE

## 2025-06-11 ENCOUNTER — CARDPULM VISIT (OUTPATIENT)
Age: 80
End: 2025-06-11
Attending: INTERNAL MEDICINE
Payer: MEDICARE

## 2025-06-11 PROCEDURE — 93798 PHYS/QHP OP CAR RHAB W/ECG: CPT

## 2025-06-12 ENCOUNTER — APPOINTMENT (OUTPATIENT)
Age: 80
End: 2025-06-12
Attending: INTERNAL MEDICINE
Payer: MEDICARE

## 2025-06-13 ENCOUNTER — CARDPULM VISIT (OUTPATIENT)
Age: 80
End: 2025-06-13
Attending: INTERNAL MEDICINE
Payer: MEDICARE

## 2025-06-13 PROCEDURE — 93798 PHYS/QHP OP CAR RHAB W/ECG: CPT

## 2025-06-16 ENCOUNTER — CARDPULM VISIT (OUTPATIENT)
Age: 80
End: 2025-06-16
Attending: INTERNAL MEDICINE
Payer: MEDICARE

## 2025-06-16 PROCEDURE — 93798 PHYS/QHP OP CAR RHAB W/ECG: CPT

## 2025-06-17 ENCOUNTER — APPOINTMENT (OUTPATIENT)
Age: 80
End: 2025-06-17
Attending: INTERNAL MEDICINE
Payer: MEDICARE

## 2025-06-18 ENCOUNTER — CARDPULM VISIT (OUTPATIENT)
Age: 80
End: 2025-06-18
Attending: INTERNAL MEDICINE
Payer: MEDICARE

## 2025-06-18 PROCEDURE — 93798 PHYS/QHP OP CAR RHAB W/ECG: CPT

## 2025-06-19 ENCOUNTER — APPOINTMENT (OUTPATIENT)
Age: 80
End: 2025-06-19
Attending: INTERNAL MEDICINE
Payer: MEDICARE

## 2025-06-20 ENCOUNTER — CARDPULM VISIT (OUTPATIENT)
Age: 80
End: 2025-06-20
Attending: INTERNAL MEDICINE
Payer: MEDICARE

## 2025-06-20 PROCEDURE — 93798 PHYS/QHP OP CAR RHAB W/ECG: CPT

## 2025-06-23 ENCOUNTER — CARDPULM VISIT (OUTPATIENT)
Age: 80
End: 2025-06-23
Attending: INTERNAL MEDICINE
Payer: MEDICARE

## 2025-06-23 PROCEDURE — 93798 PHYS/QHP OP CAR RHAB W/ECG: CPT

## 2025-06-24 ENCOUNTER — APPOINTMENT (OUTPATIENT)
Age: 80
End: 2025-06-24
Attending: INTERNAL MEDICINE
Payer: MEDICARE

## 2025-06-25 ENCOUNTER — CARDPULM VISIT (OUTPATIENT)
Age: 80
End: 2025-06-25
Attending: INTERNAL MEDICINE
Payer: MEDICARE

## 2025-06-25 PROCEDURE — 93798 PHYS/QHP OP CAR RHAB W/ECG: CPT

## 2025-06-26 ENCOUNTER — APPOINTMENT (OUTPATIENT)
Age: 80
End: 2025-06-26
Attending: INTERNAL MEDICINE
Payer: MEDICARE

## 2025-06-27 ENCOUNTER — CARDPULM VISIT (OUTPATIENT)
Age: 80
End: 2025-06-27
Attending: INTERNAL MEDICINE
Payer: MEDICARE

## 2025-06-27 PROCEDURE — 93798 PHYS/QHP OP CAR RHAB W/ECG: CPT

## 2025-06-30 ENCOUNTER — APPOINTMENT (OUTPATIENT)
Age: 80
End: 2025-06-30
Attending: INTERNAL MEDICINE
Payer: MEDICARE

## 2025-07-02 ENCOUNTER — CARDPULM VISIT (OUTPATIENT)
Age: 80
End: 2025-07-02
Attending: INTERNAL MEDICINE
Payer: MEDICARE

## 2025-07-02 PROCEDURE — 93798 PHYS/QHP OP CAR RHAB W/ECG: CPT

## 2025-07-04 ENCOUNTER — APPOINTMENT (OUTPATIENT)
Age: 80
End: 2025-07-04
Attending: INTERNAL MEDICINE
Payer: MEDICARE

## 2025-07-07 ENCOUNTER — CARDPULM VISIT (OUTPATIENT)
Age: 80
End: 2025-07-07
Attending: INTERNAL MEDICINE
Payer: MEDICARE

## 2025-07-07 PROCEDURE — 93798 PHYS/QHP OP CAR RHAB W/ECG: CPT

## 2025-07-09 ENCOUNTER — CARDPULM VISIT (OUTPATIENT)
Age: 80
End: 2025-07-09
Attending: INTERNAL MEDICINE
Payer: MEDICARE

## 2025-07-09 PROCEDURE — 93798 PHYS/QHP OP CAR RHAB W/ECG: CPT

## 2025-07-11 ENCOUNTER — CARDPULM VISIT (OUTPATIENT)
Age: 80
End: 2025-07-11
Attending: INTERNAL MEDICINE
Payer: MEDICARE

## 2025-07-11 PROCEDURE — 93798 PHYS/QHP OP CAR RHAB W/ECG: CPT

## 2025-07-14 ENCOUNTER — CARDPULM VISIT (OUTPATIENT)
Age: 80
End: 2025-07-14
Attending: INTERNAL MEDICINE
Payer: MEDICARE

## 2025-07-14 PROCEDURE — 93798 PHYS/QHP OP CAR RHAB W/ECG: CPT

## 2025-07-16 ENCOUNTER — CARDPULM VISIT (OUTPATIENT)
Age: 80
End: 2025-07-16
Attending: INTERNAL MEDICINE
Payer: MEDICARE

## 2025-07-16 PROCEDURE — 93798 PHYS/QHP OP CAR RHAB W/ECG: CPT

## 2025-07-18 ENCOUNTER — CARDPULM VISIT (OUTPATIENT)
Age: 80
End: 2025-07-18
Attending: INTERNAL MEDICINE
Payer: MEDICARE

## 2025-07-18 PROCEDURE — 93798 PHYS/QHP OP CAR RHAB W/ECG: CPT

## 2025-07-21 ENCOUNTER — CARDPULM VISIT (OUTPATIENT)
Age: 80
End: 2025-07-21
Attending: INTERNAL MEDICINE
Payer: MEDICARE

## 2025-07-21 PROCEDURE — 93798 PHYS/QHP OP CAR RHAB W/ECG: CPT

## 2025-07-23 ENCOUNTER — CARDPULM VISIT (OUTPATIENT)
Age: 80
End: 2025-07-23
Attending: INTERNAL MEDICINE
Payer: MEDICARE

## 2025-07-23 PROCEDURE — 93798 PHYS/QHP OP CAR RHAB W/ECG: CPT

## 2025-07-25 ENCOUNTER — CARDPULM VISIT (OUTPATIENT)
Age: 80
End: 2025-07-25
Attending: INTERNAL MEDICINE
Payer: MEDICARE

## 2025-07-28 ENCOUNTER — APPOINTMENT (OUTPATIENT)
Age: 80
End: 2025-07-28
Attending: INTERNAL MEDICINE
Payer: MEDICARE

## 2025-07-30 ENCOUNTER — APPOINTMENT (OUTPATIENT)
Age: 80
End: 2025-07-30
Attending: INTERNAL MEDICINE
Payer: MEDICARE

## 2025-08-01 ENCOUNTER — APPOINTMENT (OUTPATIENT)
Facility: HOSPITAL | Age: 80
End: 2025-08-01
Attending: INTERNAL MEDICINE

## 2025-08-04 ENCOUNTER — APPOINTMENT (OUTPATIENT)
Facility: HOSPITAL | Age: 80
End: 2025-08-04
Attending: INTERNAL MEDICINE

## (undated) DEVICE — FILTERLINE NASAL ADULT O2/CO2

## (undated) DEVICE — ENDOSCOPY PACK - LOWER: Brand: MEDLINE INDUSTRIES, INC.

## (undated) DEVICE — ENDOSCOPY PACK UPPER: Brand: MEDLINE INDUSTRIES, INC.

## (undated) DEVICE — 1200CC GUARDIAN II: Brand: GUARDIAN

## (undated) DEVICE — Device: Brand: DEFENDO AIR/WATER/SUCTION AND BIOPSY VALVE

## (undated) DEVICE — 3M™ RED DOT™ MONITORING ELECTRODE WITH FOAM TAPE AND STICKY GEL, 50/BAG, 20/CASE, 72/PLT 2570: Brand: RED DOT™

## (undated) DEVICE — FORCEP BIOPSY RJ4 LG CAP W/ND

## (undated) NOTE — MR AVS SNAPSHOT
After Visit Summary   10/3/2023    Ashely Driver   MRN: [id number]           Visit Information     Date & Time  10/3/2023  9:00 AM Provider  [address] EEG Dept. Phone  [phone] Were     LMP   11/14[birth year]1983             Allergies as of 10/3/2023  Review status set to Review Complete on 9/20/2023       Noted Reaction Type Reactions    Aspirin 05/11/2011    RASH    Codeine 05/11/2011    NAUSEA AND VOMITING    Erythromycin 05/11/2011    ANAPHYLAXIS, SWELLING    Lexapro 05/11/2011        Omnicef 05/11/2011        Paxil Cr [fd&c Blue #2 Al Maguire-paroxetine] 05/11/2011    DIARRHEA    Streptomycin 05/11/2011        Sulfa Antibiotics 05/11/2011        Symbicort 05/11/2011        Tylenol [acetaminophen] 05/11/2011    DIARRHEA    Zoloft 05/11/2011    DIARRHEA    Paroxetine Hcl 05/11/2011    DIARRHEA      Your Current Medications        Dosage    atorvastatin 40 MG Oral Tab     diphenoxylate-atropine 2.5-0.025 MG Oral Tab Take 1 tablet by mouth 2 (two) times daily as needed for Diarrhea. ALPRAZolam 0.5 MG Oral Tab Take 1 tablet (0.5 mg total) by mouth 2 (two) times daily as needed. dicyclomine 10 MG Oral Cap Take 1 capsule (10 mg total) by mouth 4 (four) times daily as needed. levothyroxine 112 MCG Oral Tab Take 1 tablet (112 mcg total) by mouth before breakfast.    venlafaxine 75 MG Oral Capsule SR 24 Hr Take 3 capsules (225 mg total) by mouth daily. lisinopril 10 MG Oral Tab Take 1 tablet (10 mg total) by mouth daily. metoprolol succinate 25 MG Oral Tablet 24 Hr Take 0.5 tablets (12.5 mg total) by mouth daily. ALENDRONATE 70 MG Oral Tab TAKE ONE TABLET BY MOUTH ONE TIME WEEKLY     Wheat Dextrin (BENEFIBER OR) Take by mouth. Immune Globulin, Human, 10 GM/100ML Injection Solution Inject 40 g into the vein every 28 days.  Gammagard    Albuterol Sulfate HFA (PROAIR HFA) 108 (90 Base) MCG/ACT Inhalation Aero Soln Inhale 2 puffs into the lungs every 4 (four) hours as needed for Wheezing or Shortness of Breath. FOLIC ACID OR Take by mouth. Acidophilus/Pectin Oral Cap Take 1 capsule by mouth nightly. Diagnoses for This Visit    Left temporal headache   [683032]    Near syncope   [735588]             We Ordered the Following     Normal Orders This Visit    EEG (At [facility]) Cheryle Oris                 Did you know that [facility] primary care physicians now offer Video Visits through [address] for adult patients for a variety of conditions such as allergies, back pain and cold symptoms? Skip the drive and waiting room and online chat with a doctor face-to-face using your web-cam enabled computer or mobile device wherever you are. Video Visits cost $50 and can be paid hassle-free using a credit, debit, or health savings card. Not active on [email or web address]? Ask us how to get signed up today! If you receive a survey from [employer], please take a few minutes to complete it and provide feedback. We strive to deliver the best patient experience and are looking for ways to make improvements. Your feedback will help us do so. For more information on [facility], please visit [email or web address]           No text in SmartText           No text in SmartText